# Patient Record
Sex: FEMALE | Race: WHITE | Employment: FULL TIME | URBAN - METROPOLITAN AREA
[De-identification: names, ages, dates, MRNs, and addresses within clinical notes are randomized per-mention and may not be internally consistent; named-entity substitution may affect disease eponyms.]

---

## 2018-03-16 ENCOUNTER — OFFICE VISIT (OUTPATIENT)
Dept: CARDIOLOGY CLINIC | Age: 53
End: 2018-03-16

## 2018-03-16 VITALS
BODY MASS INDEX: 46.69 KG/M2 | OXYGEN SATURATION: 96 % | WEIGHT: 273.5 LBS | HEIGHT: 64 IN | DIASTOLIC BLOOD PRESSURE: 80 MMHG | HEART RATE: 60 BPM | SYSTOLIC BLOOD PRESSURE: 122 MMHG

## 2018-03-16 DIAGNOSIS — R07.2 PRECORDIAL PAIN: ICD-10-CM

## 2018-03-16 DIAGNOSIS — R94.31 ABNORMAL ECG: ICD-10-CM

## 2018-03-16 DIAGNOSIS — Z01.810 PREOPERATIVE CARDIOVASCULAR EXAMINATION: Primary | ICD-10-CM

## 2018-03-16 PROCEDURE — 3017F COLORECTAL CA SCREEN DOC REV: CPT | Performed by: INTERNAL MEDICINE

## 2018-03-16 PROCEDURE — G8417 CALC BMI ABV UP PARAM F/U: HCPCS | Performed by: INTERNAL MEDICINE

## 2018-03-16 PROCEDURE — 93000 ELECTROCARDIOGRAM COMPLETE: CPT | Performed by: INTERNAL MEDICINE

## 2018-03-16 PROCEDURE — G8427 DOCREV CUR MEDS BY ELIG CLIN: HCPCS | Performed by: INTERNAL MEDICINE

## 2018-03-16 PROCEDURE — G8484 FLU IMMUNIZE NO ADMIN: HCPCS | Performed by: INTERNAL MEDICINE

## 2018-03-16 PROCEDURE — 1036F TOBACCO NON-USER: CPT | Performed by: INTERNAL MEDICINE

## 2018-03-16 PROCEDURE — 99214 OFFICE O/P EST MOD 30 MIN: CPT | Performed by: INTERNAL MEDICINE

## 2018-03-16 RX ORDER — TIZANIDINE HYDROCHLORIDE 4 MG/1
4 CAPSULE, GELATIN COATED ORAL 3 TIMES DAILY
COMMUNITY

## 2018-03-16 NOTE — LETTER
to be atypical for cardiac etiology  - patient had normal stress echo in 2009 and normal Myoview GXT in 2015 as above. - no current exertional chest pain but is limited by back pain. Plan:  1. [facility] soon  2. Determine risk stratification based on results of Lexiscan myoview. 3.  FU in Sept 2018. Thank you for allowing me to participate in the care of this individual.    Scribe's attestation: This note was scribed in the presence of Laura Carbajal M.D. by Freedom Cuellar RN    Physician Attestation: The scribe's documentation has been prepared under my direction and personally reviewed by me in its entirety. I confirm that the note above accurately reflects all work, treatment, procedures, and medical decision making performed by me. Crystal Torres M.D., [facility] [place]               If you have questions, please do not hesitate to call me. I look forward to following Iona Brunner along with you.     Sincerely,        Laura Carbajal MD

## 2018-03-16 NOTE — PROGRESS NOTES
never smoked. She has never used smokeless tobacco. She reports that she drinks alcohol. Family History:  family history includes Arthritis in her mother; Cancer in her father; Heart Disease in her mother; Other in her mother. Home Medications:  Prior to Admission medications    Medication Sig Start Date End Date Taking? Authorizing Provider   VORTIoxetine HBr (TRINTELLIX) 20 MG TABS tablet Take 10 mg by mouth daily   Yes Historical Provider, MD   tiZANidine (ZANAFLEX) 4 MG capsule Take 4 mg by mouth 3 times daily   Yes Historical Provider, MD   SITagliptin (JANUVIA) 100 MG tablet Take 100 mg by mouth daily   Yes Historical Provider, MD   traZODone (DESYREL) 50 MG tablet Take 100 mg by mouth nightly 1 1/2 tab nightly PRN 2/25/16  Yes Historical Provider, MD   meloxicam (MOBIC) 15 MG tablet Take 15 mg by mouth daily  4/12/16  Yes Historical Provider, MD   Cholecalciferol (VITAMIN D) 2000 UNITS CAPS capsule Take by mouth 2 times daily   Yes Historical Provider, MD   Cyanocobalamin (B-12) 2500 MCG SUBL Place under the tongue daily   Yes Historical Provider, MD   psyllium (KONSYL) 28.3 % PACK Take 1 packet by mouth daily   Yes Historical Provider, MD   HYDROcodone-acetaminophen (NORCO) 5-325 MG per tablet Take one tablet by mouth up to once per day, up to six days per week  Patient taking differently: 1 tablet Take 1-2 tablets by mouth once per day PRN 12/15/14  Yes John Randhawa DO   estradiol (VIVELLE) 0.1 MG/24HR Place 1 patch onto the skin Twice a Week. Yes Historical Provider, MD   Probiotic Product (VSL#3 DS) PACK Take 1 packet by mouth daily. Yes Historical Provider, MD   clonazePAM (KLONOPIN) 0.5 MG tablet Take 0.5 mg by mouth 3 times daily as needed    Yes Historical Provider, MD   atenolol (TENORMIN) 50 MG tablet Take 50 mg by mouth daily. Yes Historical Provider, MD   fluticasone (FLONASE) 50 MCG/ACT nasal spray 1 spray by Nasal route daily.    Yes Historical Provider, MD   cetirizine (ZYRTEC)

## 2018-03-17 NOTE — COMMUNICATION BODY
prepared under my direction and personally reviewed by me in its entirety. I confirm that the note above accurately reflects all work, treatment, procedures, and medical decision making performed by me. Juan Manuel Eldridge M.D., [facility]

## 2018-03-22 ENCOUNTER — HOSPITAL ENCOUNTER (OUTPATIENT)
Dept: NON INVASIVE DIAGNOSTICS | Age: 53
Discharge: HOME | End: 2018-03-22
Attending: INTERNAL MEDICINE | Admitting: INTERNAL MEDICINE

## 2018-03-22 DIAGNOSIS — Z01.810 ENCOUNTER FOR PREPROCEDURAL CARDIOVASCULAR EXAMINATION: ICD-10-CM

## 2018-03-22 LAB
LV EF: 68 %
LVEF MODALITY: NORMAL

## 2018-03-22 RX ORDER — AMINOPHYLLINE DIHYDRATE 25 MG/ML
100 INJECTION, SOLUTION INTRAVENOUS ONCE
Status: COMPLETED | OUTPATIENT
Start: 2018-03-22 | End: 2018-03-22

## 2018-03-22 RX ADMIN — AMINOPHYLLINE DIHYDRATE 100 MG: 25 INJECTION, SOLUTION INTRAVENOUS at 10:04

## 2018-03-22 NOTE — PROGRESS NOTES
Patient instructed on Lexiscan Protocol Stress Test Procedure including possible side effects and adverse reactions. Verbalizes knowledge and understanding and denies having any questions.  Mira Chapman, RN

## 2018-04-15 PROBLEM — Z01.810 PREOPERATIVE CARDIOVASCULAR EXAMINATION: Status: RESOLVED | Noted: 2018-03-16 | Resolved: 2018-04-15

## 2018-04-23 ENCOUNTER — TELEPHONE (OUTPATIENT)
Dept: CARDIOLOGY CLINIC | Age: 53
End: 2018-04-23

## 2018-08-29 ENCOUNTER — OFFICE VISIT (OUTPATIENT)
Dept: CARDIOLOGY CLINIC | Age: 53
End: 2018-08-29

## 2018-08-29 VITALS
HEIGHT: 64 IN | DIASTOLIC BLOOD PRESSURE: 80 MMHG | WEIGHT: 231.12 LBS | HEART RATE: 68 BPM | SYSTOLIC BLOOD PRESSURE: 118 MMHG | BODY MASS INDEX: 39.46 KG/M2

## 2018-08-29 DIAGNOSIS — R94.31 ABNORMAL ECG: ICD-10-CM

## 2018-08-29 DIAGNOSIS — R07.2 PRECORDIAL PAIN: Primary | ICD-10-CM

## 2018-08-29 PROCEDURE — G8417 CALC BMI ABV UP PARAM F/U: HCPCS | Performed by: INTERNAL MEDICINE

## 2018-08-29 PROCEDURE — G8427 DOCREV CUR MEDS BY ELIG CLIN: HCPCS | Performed by: INTERNAL MEDICINE

## 2018-08-29 PROCEDURE — 99213 OFFICE O/P EST LOW 20 MIN: CPT | Performed by: INTERNAL MEDICINE

## 2018-08-29 PROCEDURE — 1036F TOBACCO NON-USER: CPT | Performed by: INTERNAL MEDICINE

## 2018-08-29 PROCEDURE — 3017F COLORECTAL CA SCREEN DOC REV: CPT | Performed by: INTERNAL MEDICINE

## 2018-08-29 NOTE — PROGRESS NOTES
beat     Osteoarthritis     PTSD (post-traumatic stress disorder)     Ulcerative colitis ([facility] [address]        Past Surgical History:   Procedure Laterality Date    CARPAL TUNNEL RELEASE      right wrist    COLECTOMY      COLON SURGERY      COLONOSCOPY      ENDOMETRIAL ABLATION      HYSTERECTOMY      LAMINECTOMY      LUMBAR FUSION      TONSILLECTOMY      UVULOPALATOPHARYGOPLASTY         Social History   Substance Use Topics    Smoking status: Never Smoker    Smokeless tobacco: Never Used    Alcohol use Yes      Comment: rarely        Family History   Problem Relation Age of Onset    Arthritis Mother     Other Mother         IHSS    Heart Disease Mother     Cancer Father        PHYSICAL EXAMINATION:  Vitals:    08/29/18 1600   BP: 118/80   Site: Left Arm   Position: Sitting   Cuff Size: Large Adult   Pulse: 68   Weight: 231 lb 1.9 oz (104.8 kg)   Height: 5' 4\" (1.626 m)     Estimated body mass index is 39.67 kg/m² as calculated from the following:    Height as of this encounter: 5' 4\" (1.626 m). Weight as of this encounter: 231 lb 1.9 oz (104.8 kg). General Appearance: No apparent distress  Eyes:  · Conjunctiva clear  · Pupils equal, round, reactive to light  ENT:  · External Ears and Nose unremarkable  · Oral mucosa is moist  Respiratory:  · Normal excursion and expansion without use of accessory muscles  · Resp Auscultation: Normal breath sounds without dullness  Cardiovascular:  · JVD is normal  · The carotid upstroke is normal in amplitude and contour without delay or bruit  · Apical impulse is not displaced  · Normal S1, S2. No S3.   No Murmur  · No edema  · Pedal Pulses: 2+ and equal   Abdomen:  · No masses or tenderness  · Liver/Spleen: No Abnormalities Noted  Musculoskeletal:  · Fingers without clubbing or cyanosis  · Normal Gait  Skin:  · No rash  · Normal skin turgor   Neurologic/Psychiatric:  · Alert and oriented in all spheres  · Normal mood and affect  · Memory and mentation